# Patient Record
Sex: MALE | Race: WHITE | NOT HISPANIC OR LATINO
[De-identification: names, ages, dates, MRNs, and addresses within clinical notes are randomized per-mention and may not be internally consistent; named-entity substitution may affect disease eponyms.]

---

## 2024-04-10 PROBLEM — Z00.00 ENCOUNTER FOR PREVENTIVE HEALTH EXAMINATION: Status: ACTIVE | Noted: 2024-04-10

## 2024-04-18 ENCOUNTER — APPOINTMENT (OUTPATIENT)
Dept: UROLOGY | Facility: CLINIC | Age: 29
End: 2024-04-18
Payer: SUBSIDIZED

## 2024-04-18 VITALS
SYSTOLIC BLOOD PRESSURE: 147 MMHG | DIASTOLIC BLOOD PRESSURE: 96 MMHG | HEART RATE: 73 BPM | WEIGHT: 235 LBS | OXYGEN SATURATION: 98 % | BODY MASS INDEX: 33.64 KG/M2 | HEIGHT: 70 IN

## 2024-04-18 DIAGNOSIS — E29.1 TESTICULAR HYPOFUNCTION: ICD-10-CM

## 2024-04-18 DIAGNOSIS — N50.89 OTHER SPECIFIED DISORDERS OF THE MALE GENITAL ORGANS: ICD-10-CM

## 2024-04-18 DIAGNOSIS — F17.200 NICOTINE DEPENDENCE, UNSPECIFIED, UNCOMPLICATED: ICD-10-CM

## 2024-04-18 DIAGNOSIS — Z80.3 FAMILY HISTORY OF MALIGNANT NEOPLASM OF BREAST: ICD-10-CM

## 2024-04-18 DIAGNOSIS — Z83.3 FAMILY HISTORY OF DIABETES MELLITUS: ICD-10-CM

## 2024-04-18 PROCEDURE — 99204 OFFICE O/P NEW MOD 45 MIN: CPT

## 2024-04-19 LAB
APPEARANCE: CLEAR
BACTERIA: NEGATIVE /HPF
BILIRUBIN URINE: NEGATIVE
BLOOD URINE: NEGATIVE
CAST: 0 /LPF
COLOR: YELLOW
EPITHELIAL CELLS: 1 /HPF
GLUCOSE QUALITATIVE U: NEGATIVE MG/DL
KETONES URINE: NEGATIVE MG/DL
LEUKOCYTE ESTERASE URINE: NEGATIVE
MICROSCOPIC-UA: NORMAL
NITRITE URINE: NEGATIVE
PH URINE: 6.5
PROTEIN URINE: NEGATIVE MG/DL
RED BLOOD CELLS URINE: 0 /HPF
SPECIFIC GRAVITY URINE: 1.01
UROBILINOGEN URINE: 0.2 MG/DL
WHITE BLOOD CELLS URINE: 0 /HPF

## 2024-04-22 ENCOUNTER — NON-APPOINTMENT (OUTPATIENT)
Age: 29
End: 2024-04-22

## 2024-04-22 ENCOUNTER — APPOINTMENT (OUTPATIENT)
Dept: UROLOGY | Facility: CLINIC | Age: 29
End: 2024-04-22

## 2024-04-22 PROBLEM — Z83.3 FAMILY HISTORY OF DIABETES MELLITUS: Status: ACTIVE | Noted: 2024-04-18

## 2024-04-22 PROBLEM — E29.1 HYPOGONADISM IN MALE: Status: ACTIVE | Noted: 2024-04-22

## 2024-04-22 PROBLEM — Z80.3 FAMILY HISTORY OF MALIGNANT NEOPLASM OF BREAST: Status: ACTIVE | Noted: 2024-04-18

## 2024-04-22 PROBLEM — F17.200 CURRENT SMOKER: Status: ACTIVE | Noted: 2024-04-22

## 2024-04-22 LAB — BACTERIA UR CULT: NORMAL

## 2024-04-22 NOTE — REVIEW OF SYSTEMS
[Loss of interest] : loss of interest in sexual activity [Poor quality erections] : Poor quality erections [Negative] : Heme/Lymph [see HPI] : see HPI

## 2024-04-22 NOTE — LETTER BODY
[Dear  ___] : Dear  [unfilled], [Consult Letter:] : I had the pleasure of evaluating your patient, [unfilled]. [( Thank you for referring [unfilled] for consultation for _____ )] : Thank you for referring [unfilled] for consultation for [unfilled] [Please see my note below.] : Please see my note below. [Consult Closing:] : Thank you very much for allowing me to participate in the care of this patient.  If you have any questions, please do not hesitate to contact me. [Sincerely,] : Sincerely, [FreeTextEntry3] : Derian Banegas MD  of Urology [facility]  [facility] Offices: [address] [facility], [address] [place] [address], [facility] [address] [facility], [place] [address]  TEL: [phone] FAX: [phone]

## 2024-04-22 NOTE — PHYSICAL EXAM
[Normal Appearance] : normal appearance [General Appearance - In No Acute Distress] : no acute distress [] : no respiratory distress [Abdomen Soft] : soft [Abdomen Tenderness] : non-tender [Penis Abnormality] : normal circumcised penis [Urethral Meatus] : meatus normal [Scrotum] : the scrotum was normal [Normal Station and Gait] : the gait and station were normal for the patient's age [Skin Color & Pigmentation] : normal skin color and pigmentation [No Focal Deficits] : no focal deficits [Oriented To Time, Place, And Person] : oriented to person, place, and time [No Palpable Adenopathy] : no palpable adenopathy [de-identified] : normal peripheral circulation  [de-identified] : Normal right testis, left testis hard on palpation

## 2024-04-22 NOTE — ASSESSMENT
[FreeTextEntry1] : Reviewed records. Discussed labs and imaging.   Scrotal ultrasound (4/5/2024): Left testicle: 3.8 cm hypoechoic lobular lesion with increased vascularity. Inferior medially located lesion measuring 0.9 cm. Right epididymal cyst 0.5 cm. Left epididymal cyst 0.8 and 0.6 cm.  4/10/2024: PSA: 0.64 WBC: 10.0 Hemoglobin: 16.0 Hematocrit: 47.1 Platelet: 273 Creatinine: 0.82 AST: 75 ALT: 254 Testosterone: 198  Hypogonadism: Discussed risks and benefits of Testosterone replacement therapy including effect on spermatogenesis.  Will need further workup.  Testicular mass: Tumor markers: pending. Discussed concern for testicular malignancy.  Discussed left inguinal radical orchiectomy with or without testicular prosthesis. Discussed the procedure, the risks and benefits and the postoperative course.  Discussed additional need for retroperitoneal lymph node dissection/radiation therapy/chemotherapy depending on the pathology. Discussed sperm banking.  Patient was self-pay today for the visit as [facility] does not accept his insurance. Discussed surgery with me would not be possible through insurance authorization. Discussed patient proceeding with Dr. Vazquez.  Patient would like to touch base with his employer to see if insurance can be changed.  Will get records from Dr. Vazquez and inform patient.

## 2024-04-22 NOTE — HISTORY OF PRESENT ILLNESS
[FreeTextEntry1] : Primary care physician: Dr. Santi Chong.  28-year-old male presents for second opinion for testicular mass. Had been having on and off left testicular pain for last 7 months, noticed lump 2 months ago, has not changed. Underwent scrotal ultrasound with PCP which shows concern for left testicular malignancy.   Could not have tumor markers with PCP.  Was referred to me however due to insurance issues saw Dr. Vazquez yesterday and had labs: Pending.  Has been recommended left radical orchiectomy. Has on and off difficulty maintaining erections.  Complaining of low libido. Complaining of fatigue, tiredness and lethargy.  Also has difficulty sleeping. Denies any difficulty with urination.  Denies dysuria, hematuria, lower abdominal or flank pain, nausea, vomiting, fever, chills or rigors.  No family history of genitourinary malignancy. Both paternal and maternal grandmothers have history of breast cancer.  Patient is single, in a relationship. No children yet. Current smoker.

## 2024-05-22 ENCOUNTER — APPOINTMENT (OUTPATIENT)
Dept: RADIATION ONCOLOGY | Facility: CLINIC | Age: 29
End: 2024-05-22
Payer: SELF-PAY

## 2024-05-22 VITALS
HEIGHT: 70 IN | RESPIRATION RATE: 19 BRPM | OXYGEN SATURATION: 97 % | BODY MASS INDEX: 32.93 KG/M2 | DIASTOLIC BLOOD PRESSURE: 90 MMHG | HEART RATE: 90 BPM | WEIGHT: 230 LBS | SYSTOLIC BLOOD PRESSURE: 132 MMHG

## 2024-05-22 DIAGNOSIS — C62.92 MALIGNANT NEOPLASM OF LEFT TESTIS, UNSPECIFIED WHETHER DESCENDED OR UNDESCENDED: ICD-10-CM

## 2024-05-22 PROCEDURE — 99204 OFFICE O/P NEW MOD 45 MIN: CPT

## 2024-05-22 NOTE — HISTORY OF PRESENT ILLNESS
[FreeTextEntry1] : The patient is a pleasant 28 year old male diagnosed with testicular cancer (seminoma) referred by Dr. Chong. He is an [employer] , stationed in [place] and reports history of testicular trauma (rocks) in 2014.  In 2023 he developed new intermittent left testicular pain for last 7 months and then noted a lump 2 months ago. Underwent scrotal ultrasound with PCP Dr. Chong which was concerning for left testicular malignancy. He was referred to Dr. Banegas, however due to insurance issues saw Dr. Vazquez and had labs. AFP was 3.7= WNL, HCG-B <0.2= WNL, = high. A left radical orchiectomy was recommended.  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 Tumor limited to testis. LVI cannot be determined, all margins negative. Regional LN not submitted.   5/14/24 CT C/A/P negative for metastatic disease, hepatomegaly (he has diagnosis of fatty liver).  Has reports low testosterone and occasional difficulty maintaining erections and low libido. Complaining of fatigue, tiredness and lethargy. Also has difficulty sleeping. he quit smoking. Denies any difficulty with urination, dysuria, hematuria, lower abdominal or flank pain, nausea, vomiting, fever, chills or rigors. No family history of genitourinary malignancy. Both paternal and maternal grandmothers have history of breast cancer.  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 He presents with his mother to discuss further management.

## 2024-05-22 NOTE — PHYSICAL EXAM
[General Appearance - Well Developed] : well developed [Outer Ear] : the ears and nose were normal in appearance [Heart Rate And Rhythm] : heart rate and rhythm were normal [Normal] : oriented to person, place and time, the affect was normal, the mood was normal and not anxious [Oriented To Time, Place, And Person] : oriented to person, place, and time [de-identified] : absent L testicle [de-identified] : healed L groin scar.

## 2024-05-22 NOTE — REASON FOR VISIT
[Consideration of Curative Therapy] : consideration of curative therapy for [Other: ___] : [unfilled] [Parent] : parent

## 2024-05-22 NOTE — VITALS
[Maximal Pain Intensity: 8/10] : 8/10 [Least Pain Intensity: 2/10] : 2/10 [OTC] : OTC [90: Able to carry normal activity; minor signs or symptoms of disease.] : 90: Able to carry normal activity; minor signs or symptoms of disease.  [Date: ____________] : Patient's last distress assessment performed on [unfilled]. [3 - Distress Level] : Distress Level: 3 [Patient given social work contact information and resource sheet] : Patient was given social work contact information and resource sheet

## 2024-05-22 NOTE — REVIEW OF SYSTEMS
[Joint Pain] : joint pain [Negative] : Allergic/Immunologic [FreeTextEntry9] : [employer] related injuries